# Patient Record
Sex: MALE | Race: OTHER
[De-identification: names, ages, dates, MRNs, and addresses within clinical notes are randomized per-mention and may not be internally consistent; named-entity substitution may affect disease eponyms.]

---

## 2021-10-20 ENCOUNTER — APPOINTMENT (OUTPATIENT)
Dept: PULMONOLOGY | Facility: CLINIC | Age: 61
End: 2021-10-20
Payer: COMMERCIAL

## 2021-10-20 VITALS
DIASTOLIC BLOOD PRESSURE: 60 MMHG | HEART RATE: 68 BPM | HEIGHT: 64 IN | SYSTOLIC BLOOD PRESSURE: 120 MMHG | BODY MASS INDEX: 25.61 KG/M2 | WEIGHT: 150 LBS

## 2021-10-20 DIAGNOSIS — Z78.9 OTHER SPECIFIED HEALTH STATUS: ICD-10-CM

## 2021-10-20 DIAGNOSIS — G47.33 OBSTRUCTIVE SLEEP APNEA (ADULT) (PEDIATRIC): ICD-10-CM

## 2021-10-20 PROBLEM — Z00.00 ENCOUNTER FOR PREVENTIVE HEALTH EXAMINATION: Status: ACTIVE | Noted: 2021-10-20

## 2021-10-20 PROCEDURE — 99203 OFFICE O/P NEW LOW 30 MIN: CPT

## 2021-10-20 NOTE — HISTORY OF PRESENT ILLNESS
[FreeTextEntry1] : Dr. Aniceto Wong\par 61 year old man  with no past medical history  is here in the sleep center to address ring and restless sleep.  Patient is sleepy with [place] sleepiness score of 12.  Patient has very loud snoring which disturbs his girlfriend, also has witnessed apneas.  Patient's bedtime is around 11 PM wakes up in the morning around 6 AM.  He feels tired when he wakes up.  Patient drinks 1-2 cups of coffee during the daytime. Patient does not have any headaches or nocturia.  He is sleepy while driving sometimes.\par STOPB[place] score - 4\par He got an oral apliance, was not able to use it. Even on it he had all symptoms.

## 2021-10-20 NOTE — ASSESSMENT
[FreeTextEntry1] : 61-year-old man with history of sleep apnea diagnosed few years ago at that time patient tried oral appliance therapy but was not able to tolerate it and patient continued to have symptoms even with oral appliance therapy at that time.\par \par He came back for reevaluation for his sleep apnea today.  We will do a sleep study and if it is mild sleep apnea he can use bongo therapy and if the sleep apnea is moderate to severe will consider CPAP therapy.

## 2021-10-20 NOTE — PHYSICAL EXAM
[General Appearance - Well Developed] : well developed [General Appearance - Well Nourished] : well nourished [Low Lying Soft Palate] : low lying soft palate [III] : III [Heart Sounds] : normal S1 and S2 [Murmurs] : no murmurs [] : no respiratory distress [Auscultation Breath Sounds / Voice Sounds] : lungs were clear to auscultation bilaterally [No Focal Deficits] : no focal deficits [Oriented To Time, Place, And Person] : oriented to person, place, and time [Memory Recent] : recent memory was not impaired [FreeTextEntry1] : crowded oropharynx, cpap or bongo if mild

## 2025-07-21 ENCOUNTER — TRANSCRIPTION ENCOUNTER (OUTPATIENT)
Age: 65
End: 2025-07-21